# Patient Record
Sex: MALE | Race: WHITE | Employment: STUDENT | URBAN - METROPOLITAN AREA
[De-identification: names, ages, dates, MRNs, and addresses within clinical notes are randomized per-mention and may not be internally consistent; named-entity substitution may affect disease eponyms.]

---
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
## 2024-07-02 ENCOUNTER — CONSULTATION/EVALUATION (OUTPATIENT)
Dept: URBAN - METROPOLITAN AREA CLINIC 14 | Facility: CLINIC | Age: 24
End: 2024-07-02

## 2024-07-02 DIAGNOSIS — H52.7: ICD-10-CM

## 2024-07-02 PROCEDURE — 99499LKFN LASIK CONSULT NON CANDIDATE: Mod: NC

## 2024-07-02 ASSESSMENT — KERATOMETRY
OD_K1POWER_DIOPTERS: 41.5
OS_K1POWER_DIOPTERS: 41.5
OS_AXISANGLE_DEGREES: 180
OD_K2POWER_DIOPTERS: 42.25
OS_K2POWER_DIOPTERS: 42.25
OD_AXISANGLE_DEGREES: 7
OD_AXISANGLE2_DEGREES: 97
OS_AXISANGLE2_DEGREES: 90

## 2024-07-02 ASSESSMENT — PACHYMETRY
OS_CT_UM: 622
OD_CT_UM: 615

## 2024-07-02 ASSESSMENT — VISUAL ACUITY
OS_BCVA: 20/20
OD_CC: 20/70
OD_BCVA: 20/20
OS_CC: 20/100